# Patient Record
Sex: MALE | Race: WHITE | NOT HISPANIC OR LATINO | Employment: OTHER | URBAN - METROPOLITAN AREA
[De-identification: names, ages, dates, MRNs, and addresses within clinical notes are randomized per-mention and may not be internally consistent; named-entity substitution may affect disease eponyms.]

---

## 2019-07-13 LAB
LEFT EYE DM RETINOPATHY: NEGATIVE
RIGHT EYE DM RETINOPATHY: NEGATIVE

## 2020-08-18 ENCOUNTER — TELEPHONE (OUTPATIENT)
Dept: FAMILY MEDICINE | Facility: CLINIC | Age: 75
End: 2020-08-18

## 2020-08-18 NOTE — TELEPHONE ENCOUNTER
----- Message from Avani Lange sent at 8/17/2020  5:01 PM CDT -----  Pt sister Patt calling to request an appt for Covid Screening for pt leaving out of the country.    Pt requesting a call back needs covid test results within 72 hours pt will be leaving on August 29th.      Pt sister Patt can be reached at [phone]      Thank you!

## 2020-08-18 NOTE — TELEPHONE ENCOUNTER
----- Message from Imelda López sent at 8/18/2020  9:03 AM CDT -----  Type: ?Sooner Appointment Request       Patient is requesting a sooner appointment. ?Patient declined first available appointment listed as well as another facility and provider. Patient will not accept being placed on the waitlist and is requesting a message be sent to doctor.       When is the first available appointment?  8/ 25       Would the patient rather a call back or a response via [facility]? Call Back       Best Call Back Number:  [phone]

## 2020-08-19 ENCOUNTER — TELEPHONE (OUTPATIENT)
Dept: FAMILY MEDICINE | Facility: CLINIC | Age: 75
End: 2020-08-19

## 2020-08-19 NOTE — PROGRESS NOTES
This note was created by combination of typed  and M-Modal dictation.  Transcription errors may be present.  If there are any questions, please contact me.    Assessment and Plan:   Exposure to Covid-19 Virus  Encounter for observation for suspected exposure to other biological agents ruled out  -knees negative test 7 2 hr prior to his return to [place] which is scheduled for August 29th.  Plan to perform swab August 26.  He will need some sort of letter stating negative as well as a copy of the results  -     COVID-19 Routine Screening; Future; Expected date: 08/26/2020    Type 2 diabetes mellitus without complication, without long-term current use of insulin  -on metformin 1000 daily.  Check nonfasting labs.  Significant weight gain since being here in the [place] for past 6 months.  -     Comprehensive metabolic panel; Future; Expected date: 08/20/2020  -     Hemoglobin A1C; Future; Expected date: 08/20/2020  -     CBC auto differential; Future; Expected date: 08/20/2020    Essential hypertension  -stable, he will contact me for future refills.    Dyslipidemia  -nonfasting today.  He will contact me for future refills on statin and fibrate    BPH with obstruction/lower urinary tract symptoms  -managed by urology    eROI to Dr. Pereira and Dr. Castillo    There are no discontinued medications.    meds sent this encounter:       Follow Up: No follow-ups on file. he spends most of the year in [place], [place] to [place] occasionally, plan for f/u when [place] to [place]    Subjective:     Chief Complaint   Patient presents with    Establish Care       HPI  Jose is a 75 y.o. male, last appointment with this clinic was Visit date not found.    NP    From [place] originally[place]   Then retired and moved back to [facility]  For while he was living in [place], [place]  Has been in the [place] 6 months.   Is returning to 100s but for international travel needs covid test to be negative 3 days before leaving.  8/29    No symptoms. No fever no chills, no sore throat, no cough no anosmia  His sister my patient was covid positive but he was not around her during her infection.     In [place] -   Was initially getting his care with primary care provider Dr. arian thomas.  But then he switched over pretty much to cardiology for most of his care.  He saw cardiology when he was originally hospitalized and had a cardiology consult, reports that the cardiology workup was negative for any sort of ischemic conditions.  Had a CT scan done which showed some sort of abnormality in the abdomen and so he underwent colonoscopy which reportedly was normal.  On repeat CT scan done here, no abnormal findings  He saw GI here Dr. Watts.  No procedures were done.    Labadie for urology; hx of PSA elevation in the setting of infection, improved on repeat    Repeat CT scan 7/6/2020 normal:  1.   No definitive acute intra-abdominal process.  2.   Urinary bladder wall thickening may be related to underdistention although please correlate for bladder outlet obstruction symptoms or cystitis.  3.   Mild prostatomegaly with median lobe hypertrophy protruding into the urinary bladder base.  4.   Colonic diverticulosis without diverticulitis. Mild stool in the colon.  5.   Other chronic and incidental findings as detailed in the body.    Walking less here in the [place]. Walking more in [place].  Subsequent significant weight gain.    Has no recollection of his previous A1c readings.    nonfasting today.     Dr. Kenneth Bliss [facility] [phone]  Norm Koch - rheum F [phone]  Joseph Knott F [phone] - [facility] - was initial consult when he was hospitalized. No significant findings.   Dr. Feroz Romero      Patient Care Team:  Primary Doctor No as PCP - General  Feroz Romero MD as Consulting Physician (Gastroenterology)  Pablo J. Labadie, MD as Consulting Physician (Urology)    Patient Active Problem List     Diagnosis Date Noted    Type 2 diabetes mellitus without complication, without long-term current use of insulin 2020    Essential hypertension 2020    Dyslipidemia 2020    BPH with obstruction/lower urinary tract symptoms 2020       PAST MEDICAL HISTORY:  Past Medical History:   Diagnosis Date    Hypertension        PAST SURGICAL HISTORY:  History reviewed. No pertinent surgical history.    SOCIAL HISTORY:  Social History     Socioeconomic History    Marital status:      Spouse name: Not on file    Number of children: Not on file    Years of education: Not on file    Highest education level: Not on file   Occupational History    Not on file   Social Needs    Financial resource strain: Not on file    Food insecurity     Worry: Not on file     Inability: Not on file    Transportation needs     Medical: Not on file     Non-medical: Not on file   Tobacco Use    Smoking status: Former Smoker     Packs/day: 4.00     Years: 30.00     Pack years: 120.00     Types: Cigarettes     Quit date: 1982     Years since quittin.0    Smokeless tobacco: Never Used   Substance and Sexual Activity    Alcohol use: Yes     Comment: 2 daily    Drug use: Never    Sexual activity: Not on file   Lifestyle    Physical activity     Days per week: Not on file     Minutes per session: Not on file    Stress: Not on file   Relationships    Social connections     Talks on phone: Not on file     Gets together: Not on file     Attends [employer] service: Not on file     Active member of club or organization: Not on file     Attends meetings of clubs or organizations: Not on file     Relationship status: Not on file   Other Topics Concern    Not on file   Social History Narrative    Retired, lives in [place]       ALLERGIES AND MEDICATIONS: updated and reviewed.  Review of patient's allergies indicates:  No Known Allergies         Review of Systems   Constitutional: Negative for chills and  "fever.   HENT: Negative for congestion and sore throat.    Respiratory: Negative for cough and shortness of breath.    Cardiovascular: Negative for chest pain.   Gastrointestinal: Negative for abdominal pain.   Genitourinary: Negative for dysuria.   All other systems reviewed and are negative.      Objective:   Physical Exam   Vitals:    08/20/20 1415   Pulse: 75   Temp: 97.5 °F (36.4 °C)   TempSrc: Temporal   SpO2: (!) 94%   Weight: 100.7 kg (222 lb)   Height: 5' 6" (1.676 m)    Body mass index is 35.83 kg/m².  Weight: 100.7 kg (222 lb)   Height: 5' 6" (167.6 cm)     Physical Exam  Constitutional:       General: He is not in acute distress.     Appearance: He is well-developed.   Cardiovascular:      Rate and Rhythm: Normal rate and regular rhythm.      Heart sounds: Normal heart sounds. No murmur.   Pulmonary:      Effort: Pulmonary effort is normal.      Breath sounds: Normal breath sounds.   Musculoskeletal: Normal range of motion.      Right lower leg: No edema.      Left lower leg: No edema.   Lymphadenopathy:      Cervical: No cervical adenopathy.   Skin:     General: Skin is warm and dry.   Neurological:      Mental Status: He is alert and oriented to person, place, and time.      Coordination: Coordination normal.   Psychiatric:         Behavior: Behavior normal.         Thought Content: Thought content normal.         "

## 2020-08-20 ENCOUNTER — OFFICE VISIT (OUTPATIENT)
Dept: FAMILY MEDICINE | Facility: CLINIC | Age: 75
End: 2020-08-20
Payer: MEDICARE

## 2020-08-20 ENCOUNTER — LAB VISIT (OUTPATIENT)
Dept: LAB | Facility: HOSPITAL | Age: 75
End: 2020-08-20
Attending: INTERNAL MEDICINE
Payer: MEDICARE

## 2020-08-20 VITALS
WEIGHT: 222 LBS | OXYGEN SATURATION: 94 % | BODY MASS INDEX: 35.68 KG/M2 | HEIGHT: 66 IN | TEMPERATURE: 98 F | HEART RATE: 75 BPM

## 2020-08-20 DIAGNOSIS — E11.9 TYPE 2 DIABETES MELLITUS WITHOUT COMPLICATION, WITHOUT LONG-TERM CURRENT USE OF INSULIN: ICD-10-CM

## 2020-08-20 DIAGNOSIS — N40.1 BPH WITH OBSTRUCTION/LOWER URINARY TRACT SYMPTOMS: ICD-10-CM

## 2020-08-20 DIAGNOSIS — E78.5 DYSLIPIDEMIA: ICD-10-CM

## 2020-08-20 DIAGNOSIS — Z20.822 EXPOSURE TO COVID-19 VIRUS: Primary | ICD-10-CM

## 2020-08-20 DIAGNOSIS — I10 ESSENTIAL HYPERTENSION: ICD-10-CM

## 2020-08-20 DIAGNOSIS — N13.8 BPH WITH OBSTRUCTION/LOWER URINARY TRACT SYMPTOMS: ICD-10-CM

## 2020-08-20 DIAGNOSIS — Z03.818 ENCOUNTER FOR OBSERVATION FOR SUSPECTED EXPOSURE TO OTHER BIOLOGICAL AGENTS RULED OUT: ICD-10-CM

## 2020-08-20 LAB
ALBUMIN SERPL BCP-MCNC: 4.2 G/DL (ref 3.5–5.2)
ALP SERPL-CCNC: 34 U/L (ref 55–135)
ALT SERPL W/O P-5'-P-CCNC: 23 U/L (ref 10–44)
ANION GAP SERPL CALC-SCNC: 7 MMOL/L (ref 8–16)
AST SERPL-CCNC: 26 U/L (ref 10–40)
BASOPHILS # BLD AUTO: 0.07 K/UL (ref 0–0.2)
BASOPHILS NFR BLD: 0.8 % (ref 0–1.9)
BILIRUB SERPL-MCNC: 0.3 MG/DL (ref 0.1–1)
BUN SERPL-MCNC: 25 MG/DL (ref 8–23)
CALCIUM SERPL-MCNC: 9.9 MG/DL (ref 8.7–10.5)
CHLORIDE SERPL-SCNC: 102 MMOL/L (ref 95–110)
CO2 SERPL-SCNC: 29 MMOL/L (ref 23–29)
CREAT SERPL-MCNC: 1.3 MG/DL (ref 0.5–1.4)
DIFFERENTIAL METHOD: ABNORMAL
EOSINOPHIL # BLD AUTO: 0.3 K/UL (ref 0–0.5)
EOSINOPHIL NFR BLD: 3.6 % (ref 0–8)
ERYTHROCYTE [DISTWIDTH] IN BLOOD BY AUTOMATED COUNT: 12.4 % (ref 11.5–14.5)
EST. GFR  (AFRICAN AMERICAN): >60 ML/MIN/1.73 M^2
EST. GFR  (NON AFRICAN AMERICAN): 53.4 ML/MIN/1.73 M^2
ESTIMATED AVG GLUCOSE: 140 MG/DL (ref 68–131)
GLUCOSE SERPL-MCNC: 107 MG/DL (ref 70–110)
HBA1C MFR BLD HPLC: 6.5 % (ref 4–5.6)
HCT VFR BLD AUTO: 37.5 % (ref 40–54)
HGB BLD-MCNC: 11.8 G/DL (ref 14–18)
IMM GRANULOCYTES # BLD AUTO: 0.06 K/UL (ref 0–0.04)
IMM GRANULOCYTES NFR BLD AUTO: 0.7 % (ref 0–0.5)
LYMPHOCYTES # BLD AUTO: 2.4 K/UL (ref 1–4.8)
LYMPHOCYTES NFR BLD: 26.5 % (ref 18–48)
MCH RBC QN AUTO: 29.4 PG (ref 27–31)
MCHC RBC AUTO-ENTMCNC: 31.5 G/DL (ref 32–36)
MCV RBC AUTO: 94 FL (ref 82–98)
MONOCYTES # BLD AUTO: 0.8 K/UL (ref 0.3–1)
MONOCYTES NFR BLD: 8.7 % (ref 4–15)
NEUTROPHILS # BLD AUTO: 5.4 K/UL (ref 1.8–7.7)
NEUTROPHILS NFR BLD: 59.7 % (ref 38–73)
NRBC BLD-RTO: 0 /100 WBC
PLATELET # BLD AUTO: 183 K/UL (ref 150–350)
PMV BLD AUTO: 11.7 FL (ref 9.2–12.9)
POTASSIUM SERPL-SCNC: 4.2 MMOL/L (ref 3.5–5.1)
PROT SERPL-MCNC: 7.5 G/DL (ref 6–8.4)
RBC # BLD AUTO: 4.01 M/UL (ref 4.6–6.2)
SODIUM SERPL-SCNC: 138 MMOL/L (ref 136–145)
WBC # BLD AUTO: 9.1 K/UL (ref 3.9–12.7)

## 2020-08-20 PROCEDURE — 85025 COMPLETE CBC W/AUTO DIFF WBC: CPT

## 2020-08-20 PROCEDURE — 99204 OFFICE O/P NEW MOD 45 MIN: CPT | Mod: S$PBB,,, | Performed by: INTERNAL MEDICINE

## 2020-08-20 PROCEDURE — 99999 PR PBB SHADOW E&M-EST. PATIENT-LVL III: ICD-10-PCS | Mod: PBBFAC,,, | Performed by: INTERNAL MEDICINE

## 2020-08-20 PROCEDURE — 36415 COLL VENOUS BLD VENIPUNCTURE: CPT | Mod: PO

## 2020-08-20 PROCEDURE — 83036 HEMOGLOBIN GLYCOSYLATED A1C: CPT

## 2020-08-20 PROCEDURE — 80053 COMPREHEN METABOLIC PANEL: CPT

## 2020-08-20 PROCEDURE — 99999 PR PBB SHADOW E&M-EST. PATIENT-LVL III: CPT | Mod: PBBFAC,,, | Performed by: INTERNAL MEDICINE

## 2020-08-20 PROCEDURE — 99204 PR OFFICE/OUTPT VISIT, NEW, LEVL IV, 45-59 MIN: ICD-10-PCS | Mod: S$PBB,,, | Performed by: INTERNAL MEDICINE

## 2020-08-20 PROCEDURE — 99213 OFFICE O/P EST LOW 20 MIN: CPT | Mod: PBBFAC,PO | Performed by: INTERNAL MEDICINE

## 2020-08-20 RX ORDER — TAMSULOSIN HYDROCHLORIDE 0.4 MG/1
0.4 CAPSULE ORAL
COMMUNITY
Start: 2020-07-21

## 2020-08-20 RX ORDER — METFORMIN HYDROCHLORIDE 1000 MG/1
1000 TABLET ORAL
COMMUNITY
Start: 2020-06-23 | End: 2021-12-13 | Stop reason: SDUPTHER

## 2020-08-20 RX ORDER — ATORVASTATIN CALCIUM 20 MG/1
20 TABLET, FILM COATED ORAL
COMMUNITY
Start: 2020-06-23 | End: 2021-12-03 | Stop reason: SDUPTHER

## 2020-08-20 RX ORDER — TELMISARTAN AND HYDROCHLORTHIAZIDE 80; 12.5 MG/1; MG/1
1 TABLET ORAL
COMMUNITY
Start: 2020-06-23 | End: 2021-12-03 | Stop reason: SDUPTHER

## 2020-08-20 RX ORDER — VITAMIN B COMPLEX
1 CAPSULE ORAL
COMMUNITY

## 2020-08-20 RX ORDER — FENOFIBRATE 145 MG/1
145 TABLET, FILM COATED ORAL
COMMUNITY
Start: 2020-06-23 | End: 2021-12-13 | Stop reason: SDUPTHER

## 2020-08-20 NOTE — LETTER
August 20, 2020    Dr. Castillo/staff               [facility]  [address] [place] [address]  Phone: [phone]  Fax: [phone] August 20, 2020     Patient: Joes Robertson    YOB: 1945   Date of Visit: 8/20/2020     AUTHORIZATION FOR RELEASE OF   CONFIDENTIAL INFORMATION     Dear Dr. Castillo/staff,     We are seeing Jose Robertson, date of birth 1945, in the clinic at [facility] FAMILY MED/ INTERNAL MED/ PEDS. Paras George MD is the patient's PCP. Jose Robertson  has an outstanding lab/procedure at the time we reviewed him chart. In order to help keep his health information updated, he has authorized us to request the following medical record(s):          (  )  MAMMOGRAM                                      (  )  COLONOSCOPY      (  )  PAP SMEAR                                          (  )  OUTSIDE LAB RESULTS      (  )  DEXA SCAN                                          (  )  EYE EXAM             (  )  FOOT EXAM                                          ( xx )  ENTIRE RECORD      (  )  OUTSIDE IMMUNIZATIONS                 (  )  _______________            Please fax records to [facility], Marvin P Dair, MD,  [phone] [address] [place] [address]         If you have any questions, please contact GRISELDA Laurent at [phone]

## 2020-08-20 NOTE — LETTER
August 20, 2020    Dr. Bliss/staff               [facility]  [address] [place] [address]  Phone: [phone]  Fax: [phone] August 20, 2020     Patient: Jose Robertson    YOB: 1945   Date of Visit: 8/20/2020     AUTHORIZATION FOR RELEASE OF   CONFIDENTIAL INFORMATION     Dear Dr. Bliss/staff,     We are seeing Jose Robertson, date of birth 1945, in the clinic at [facility] FAMILY MED/ INTERNAL MED/ PEDS. Paras George MD is the patient's PCP. Jose Robertson  has an outstanding lab/procedure at the time we reviewed him chart. In order to help keep his health information updated, he has authorized us to request the following medical record(s):          (  )  MAMMOGRAM                                      (  )  COLONOSCOPY      (  )  PAP SMEAR                                          (  )  OUTSIDE LAB RESULTS      (  )  DEXA SCAN                                          (  )  EYE EXAM             (  )  FOOT EXAM                                          (xx  )  ENTIRE RECORD      (  )  OUTSIDE IMMUNIZATIONS                 (  )  _______________            Please fax records to [facility], Marvin P Dair, MD,  [phone] [address] [place] [address]         If you have any questions, please contact GRISELDA Laurent at [phone]

## 2020-08-21 NOTE — PROGRESS NOTES
A1c 6.5 no previous for comparison  CMP creatinine 1.3 no previous for comparison. Puts him in the CKD stage 3 category if not acute  CBC with mild normocytic anemia, no previous for comparison. Reportedly had normal c scope done in [place] in the last 5 years.      eROI was sent for his previous doctors.

## 2020-08-21 NOTE — PROGRESS NOTES
A1c 6.5 no previous for comparison  CMP creatinine 1.3   CBC with mild normocytic anemia. Reportedly had normal c scope done in [place] in the last 5 years.      Labs done June 30th with Deny Henao, creatinine 1.08, CBC with similar findings anemia    MonitorJose Donnelly was sent for his previous doctors.

## 2020-08-26 ENCOUNTER — PATIENT MESSAGE (OUTPATIENT)
Dept: FAMILY MEDICINE | Facility: CLINIC | Age: 75
End: 2020-08-26

## 2020-08-26 ENCOUNTER — LAB VISIT (OUTPATIENT)
Dept: FAMILY MEDICINE | Facility: CLINIC | Age: 75
End: 2020-08-26
Payer: MEDICARE

## 2020-08-26 DIAGNOSIS — Z20.822 EXPOSURE TO COVID-19 VIRUS: ICD-10-CM

## 2020-08-26 DIAGNOSIS — Z03.818 ENCOUNTER FOR OBSERVATION FOR SUSPECTED EXPOSURE TO OTHER BIOLOGICAL AGENTS RULED OUT: ICD-10-CM

## 2020-08-26 LAB — SARS-COV-2 RNA RESP QL NAA+PROBE: NOT DETECTED

## 2020-08-26 PROCEDURE — U0003 INFECTIOUS AGENT DETECTION BY NUCLEIC ACID (DNA OR RNA); SEVERE ACUTE RESPIRATORY SYNDROME CORONAVIRUS 2 (SARS-COV-2) (CORONAVIRUS DISEASE [COVID-19]), AMPLIFIED PROBE TECHNIQUE, MAKING USE OF HIGH THROUGHPUT TECHNOLOGIES AS DESCRIBED BY CMS-2020-01-R: HCPCS

## 2020-08-27 ENCOUNTER — TELEPHONE (OUTPATIENT)
Dept: FAMILY MEDICINE | Facility: CLINIC | Age: 75
End: 2020-08-27

## 2020-08-27 NOTE — TELEPHONE ENCOUNTER
----- Message from Feli Manning sent at 8/27/2020  3:47 PM CDT -----  Regarding: pt's sister mau [phone]  Type:  Test Results    Who Called:pt    Name of Test (Lab/Mammo/Etc): covid test    Date of Test:     Ordering Provider:     Where the test was performed:     Would the patient rather a call back or a response via [facility]?     Best Call Back Number:     Additional Information:      For Clinical Team:Has the provider reviewed the results?

## 2021-08-26 ENCOUNTER — PATIENT OUTREACH (OUTPATIENT)
Dept: ADMINISTRATIVE | Facility: HOSPITAL | Age: 76
End: 2021-08-26

## 2021-08-26 DIAGNOSIS — Z11.59 NEED FOR HEPATITIS C SCREENING TEST: Primary | ICD-10-CM

## 2021-12-03 DIAGNOSIS — I10 ESSENTIAL HYPERTENSION: ICD-10-CM

## 2021-12-03 DIAGNOSIS — E78.5 DYSLIPIDEMIA: ICD-10-CM

## 2021-12-03 RX ORDER — TELMISARTAN AND HYDROCHLORTHIAZIDE 80; 12.5 MG/1; MG/1
1 TABLET ORAL DAILY
Qty: 90 TABLET | Refills: 0 | Status: SHIPPED | OUTPATIENT
Start: 2021-12-03 | End: 2021-12-13 | Stop reason: SDUPTHER

## 2021-12-03 RX ORDER — ATORVASTATIN CALCIUM 20 MG/1
20 TABLET, FILM COATED ORAL DAILY
Qty: 90 TABLET | Refills: 0 | Status: SHIPPED | OUTPATIENT
Start: 2021-12-03 | End: 2021-12-13 | Stop reason: SDUPTHER

## 2021-12-06 ENCOUNTER — TELEPHONE (OUTPATIENT)
Dept: FAMILY MEDICINE | Facility: CLINIC | Age: 76
End: 2021-12-06
Payer: MEDICARE

## 2021-12-10 RX ORDER — LATANOPROST 50 UG/ML
SOLUTION/ DROPS OPHTHALMIC
COMMUNITY
Start: 2021-07-02

## 2021-12-13 ENCOUNTER — OFFICE VISIT (OUTPATIENT)
Dept: FAMILY MEDICINE | Facility: CLINIC | Age: 76
End: 2021-12-13
Payer: MEDICARE

## 2021-12-13 VITALS
DIASTOLIC BLOOD PRESSURE: 56 MMHG | SYSTOLIC BLOOD PRESSURE: 120 MMHG | OXYGEN SATURATION: 95 % | HEIGHT: 66 IN | TEMPERATURE: 98 F | HEART RATE: 83 BPM | BODY MASS INDEX: 35.83 KG/M2

## 2021-12-13 DIAGNOSIS — Z23 NEEDS FLU SHOT: ICD-10-CM

## 2021-12-13 DIAGNOSIS — E11.9 TYPE 2 DIABETES MELLITUS WITHOUT COMPLICATION, WITHOUT LONG-TERM CURRENT USE OF INSULIN: Primary | ICD-10-CM

## 2021-12-13 DIAGNOSIS — Z20.822 EXPOSURE TO COVID-19 VIRUS: ICD-10-CM

## 2021-12-13 DIAGNOSIS — I10 ESSENTIAL HYPERTENSION: ICD-10-CM

## 2021-12-13 DIAGNOSIS — E78.5 DYSLIPIDEMIA: ICD-10-CM

## 2021-12-13 PROCEDURE — 99214 PR OFFICE/OUTPT VISIT, EST, LEVL IV, 30-39 MIN: ICD-10-PCS | Mod: S$PBB,,, | Performed by: INTERNAL MEDICINE

## 2021-12-13 PROCEDURE — 99999 PR PBB SHADOW E&M-EST. PATIENT-LVL III: ICD-10-PCS | Mod: PBBFAC,,, | Performed by: INTERNAL MEDICINE

## 2021-12-13 PROCEDURE — G0008 ADMIN INFLUENZA VIRUS VAC: HCPCS | Mod: PBBFAC,PO

## 2021-12-13 PROCEDURE — 99999 PR PBB SHADOW E&M-EST. PATIENT-LVL III: CPT | Mod: PBBFAC,,, | Performed by: INTERNAL MEDICINE

## 2021-12-13 PROCEDURE — 90694 VACC AIIV4 NO PRSRV 0.5ML IM: CPT | Mod: PBBFAC,PO

## 2021-12-13 PROCEDURE — 99213 OFFICE O/P EST LOW 20 MIN: CPT | Mod: PBBFAC,PO,25 | Performed by: INTERNAL MEDICINE

## 2021-12-13 PROCEDURE — 99214 OFFICE O/P EST MOD 30 MIN: CPT | Mod: S$PBB,,, | Performed by: INTERNAL MEDICINE

## 2021-12-13 RX ORDER — FENOFIBRATE 145 MG/1
145 TABLET, FILM COATED ORAL DAILY
Qty: 90 TABLET | Refills: 3 | Status: SHIPPED | OUTPATIENT
Start: 2021-12-13 | End: 2022-12-20

## 2021-12-13 RX ORDER — UBIDECARENONE 75 MG
500 CAPSULE ORAL
COMMUNITY

## 2021-12-13 RX ORDER — ASPIRIN 81 MG/1
81 TABLET ORAL
COMMUNITY

## 2021-12-13 RX ORDER — TELMISARTAN AND HYDROCHLORTHIAZIDE 80; 12.5 MG/1; MG/1
1 TABLET ORAL DAILY
Qty: 90 TABLET | Refills: 3 | Status: SHIPPED | OUTPATIENT
Start: 2021-12-13 | End: 2022-06-16

## 2021-12-13 RX ORDER — METFORMIN HYDROCHLORIDE 1000 MG/1
1000 TABLET ORAL
Qty: 90 TABLET | Refills: 3 | Status: SHIPPED | OUTPATIENT
Start: 2021-12-13

## 2021-12-13 RX ORDER — ATORVASTATIN CALCIUM 20 MG/1
20 TABLET, FILM COATED ORAL DAILY
Qty: 90 TABLET | Refills: 3 | Status: SHIPPED | OUTPATIENT
Start: 2021-12-13

## 2021-12-14 ENCOUNTER — PATIENT OUTREACH (OUTPATIENT)
Dept: ADMINISTRATIVE | Facility: HOSPITAL | Age: 76
End: 2021-12-14
Payer: MEDICARE

## 2021-12-15 PROBLEM — Z12.11 SCREENING FOR COLON CANCER: Status: ACTIVE | Noted: 2021-12-15

## 2021-12-15 PROBLEM — H40.1131 PRIMARY OPEN ANGLE GLAUCOMA (POAG) OF BOTH EYES, MILD STAGE: Status: ACTIVE | Noted: 2021-12-15

## 2021-12-17 ENCOUNTER — LAB VISIT (OUTPATIENT)
Dept: FAMILY MEDICINE | Facility: CLINIC | Age: 76
End: 2021-12-17
Payer: MEDICARE

## 2021-12-17 DIAGNOSIS — Z20.822 EXPOSURE TO COVID-19 VIRUS: ICD-10-CM

## 2021-12-17 PROCEDURE — U0005 INFEC AGEN DETEC AMPLI PROBE: HCPCS | Performed by: INTERNAL MEDICINE

## 2021-12-17 PROCEDURE — U0003 INFECTIOUS AGENT DETECTION BY NUCLEIC ACID (DNA OR RNA); SEVERE ACUTE RESPIRATORY SYNDROME CORONAVIRUS 2 (SARS-COV-2) (CORONAVIRUS DISEASE [COVID-19]), AMPLIFIED PROBE TECHNIQUE, MAKING USE OF HIGH THROUGHPUT TECHNOLOGIES AS DESCRIBED BY CMS-2020-01-R: HCPCS | Performed by: INTERNAL MEDICINE

## 2021-12-18 LAB
SARS-COV-2 RNA RESP QL NAA+PROBE: NOT DETECTED
SARS-COV-2- CYCLE NUMBER: NORMAL

## 2022-03-16 DIAGNOSIS — Z11.59 NEED FOR HEPATITIS C SCREENING TEST: ICD-10-CM

## 2022-03-16 DIAGNOSIS — E11.9 TYPE 2 DIABETES MELLITUS WITHOUT COMPLICATION, WITHOUT LONG-TERM CURRENT USE OF INSULIN: ICD-10-CM

## 2024-02-22 ENCOUNTER — PATIENT OUTREACH (OUTPATIENT)
Dept: ADMINISTRATIVE | Facility: HOSPITAL | Age: 79
End: 2024-02-22
Payer: MEDICARE

## 2024-02-22 ENCOUNTER — PATIENT MESSAGE (OUTPATIENT)
Dept: ADMINISTRATIVE | Facility: HOSPITAL | Age: 79
End: 2024-02-22
Payer: MEDICARE